# Patient Record
Sex: FEMALE | Race: WHITE | NOT HISPANIC OR LATINO
[De-identification: names, ages, dates, MRNs, and addresses within clinical notes are randomized per-mention and may not be internally consistent; named-entity substitution may affect disease eponyms.]

---

## 2019-01-10 ENCOUNTER — APPOINTMENT (OUTPATIENT)
Dept: PLASTIC SURGERY | Facility: CLINIC | Age: 34
End: 2019-01-10
Payer: COMMERCIAL

## 2019-01-10 VITALS
HEART RATE: 74 BPM | SYSTOLIC BLOOD PRESSURE: 124 MMHG | BODY MASS INDEX: 18.44 KG/M2 | RESPIRATION RATE: 18 BRPM | TEMPERATURE: 98 F | DIASTOLIC BLOOD PRESSURE: 60 MMHG | HEIGHT: 64 IN | WEIGHT: 108 LBS | OXYGEN SATURATION: 98 %

## 2019-01-10 VITALS
WEIGHT: 108 LBS | RESPIRATION RATE: 12 BRPM | OXYGEN SATURATION: 100 % | BODY MASS INDEX: 18.44 KG/M2 | DIASTOLIC BLOOD PRESSURE: 65 MMHG | TEMPERATURE: 97.7 F | SYSTOLIC BLOOD PRESSURE: 96 MMHG | HEART RATE: 55 BPM | HEIGHT: 64 IN

## 2019-01-10 DIAGNOSIS — T85.49XA OTHER MECHANICAL COMPLICATION OF BREAST PROSTHESIS AND IMPLANT, INITIAL ENCOUNTER: ICD-10-CM

## 2019-01-10 PROBLEM — Z00.00 ENCOUNTER FOR PREVENTIVE HEALTH EXAMINATION: Status: ACTIVE | Noted: 2019-01-10

## 2019-01-10 RX ORDER — IBUPROFEN 600 MG
600 TABLET ORAL
Refills: 0 | Status: ACTIVE | COMMUNITY

## 2019-01-10 NOTE — PHYSICAL EXAM
[NI] : Normal [de-identified] : shallow respiration\par no rales or cackles\par  [de-identified] : SN-N 19 cm bnilateral\par asymmetry with right areola larger than left\par Pseudoptosis with palpable edge right implant \par No masses and no adenopathy  [de-identified] : protuberant abdomen with rectus diastasis \par No masses and no hernias

## 2019-01-10 NOTE — HISTORY OF PRESENT ILLNESS
[FreeTextEntry1] : Patient underwent Bilateral augmentation mammaplasty 13 years ago with gel implants and was well until 6 days ago when she fell sustaining four rib fractures on the right side.  She also noted now pain and feeling in the right breast and is concerned about a possible implant rupture.  She also has five children including twins born at 33 weeks 6 months ago and presents with a rectus diastasis and protuberant abdomen.

## 2019-01-10 NOTE — ASSESSMENT
[FreeTextEntry1] : A:\par Probable ruptured implants\par Pseudoptosis of the breasts with asymmetry\par Protuberant abdomen with rectus diastasis\par P:\par Ultrasound then possible MRI to evaluate the implants\par We discussed the treatment options at some length and favor removal of the implants, capsulectomies, and replacement with smaller implants and mastopexies.  Also discussed miniabdominoplasty with repair of rectus diastasis.  Reviewed the material risks,  benefits,  and alternatives with the patient, including no surgery, and patient agreed.\par

## 2019-01-10 NOTE — REASON FOR VISIT
[Initial Evaluation] : an initial evaluation [FreeTextEntry1] : Patient presents with change in right implant and pain consistent with post traumatic rupture

## 2019-01-23 ENCOUNTER — APPOINTMENT (OUTPATIENT)
Dept: PLASTIC SURGERY | Facility: CLINIC | Age: 34
End: 2019-01-23

## 2022-10-03 ENCOUNTER — APPOINTMENT (OUTPATIENT)
Dept: UROGYNECOLOGY | Facility: CLINIC | Age: 37
End: 2022-10-03

## 2022-10-04 ENCOUNTER — APPOINTMENT (OUTPATIENT)
Dept: INTERNAL MEDICINE | Facility: CLINIC | Age: 37
End: 2022-10-04

## 2023-03-28 ENCOUNTER — APPOINTMENT (OUTPATIENT)
Dept: INTERNAL MEDICINE | Facility: CLINIC | Age: 38
End: 2023-03-28
Payer: COMMERCIAL